# Patient Record
Sex: FEMALE | Race: WHITE | Employment: FULL TIME | URBAN - METROPOLITAN AREA
[De-identification: names, ages, dates, MRNs, and addresses within clinical notes are randomized per-mention and may not be internally consistent; named-entity substitution may affect disease eponyms.]

---

## 2018-01-09 ENCOUNTER — HOSPITAL ENCOUNTER (OUTPATIENT)
Dept: OTHER | Age: 38
Discharge: HOME | End: 2018-01-09
Attending: OBSTETRICS & GYNECOLOGY | Admitting: OBSTETRICS & GYNECOLOGY

## 2022-07-21 ENCOUNTER — OFFICE VISIT (OUTPATIENT)
Dept: OBGYN | Age: 42
End: 2022-07-21
Payer: COMMERCIAL

## 2022-07-21 VITALS
HEIGHT: 62 IN | DIASTOLIC BLOOD PRESSURE: 68 MMHG | SYSTOLIC BLOOD PRESSURE: 96 MMHG | WEIGHT: 146 LBS | BODY MASS INDEX: 26.87 KG/M2

## 2022-07-21 DIAGNOSIS — R10.2 CHRONIC FEMALE PELVIC PAIN: ICD-10-CM

## 2022-07-21 DIAGNOSIS — Z01.419 ENCOUNTER FOR ANNUAL ROUTINE GYNECOLOGICAL EXAMINATION: Primary | ICD-10-CM

## 2022-07-21 DIAGNOSIS — G89.29 CHRONIC FEMALE PELVIC PAIN: ICD-10-CM

## 2022-07-21 DIAGNOSIS — K59.00 CONSTIPATION, UNSPECIFIED CONSTIPATION TYPE: ICD-10-CM

## 2022-07-21 DIAGNOSIS — R14.0 BLOATING: ICD-10-CM

## 2022-07-21 DIAGNOSIS — R30.0 DYSURIA: ICD-10-CM

## 2022-07-21 DIAGNOSIS — Z12.31 SCREENING MAMMOGRAM FOR BREAST CANCER: ICD-10-CM

## 2022-07-21 PROCEDURE — 36415 COLL VENOUS BLD VENIPUNCTURE: CPT | Performed by: OBSTETRICS & GYNECOLOGY

## 2022-07-21 PROCEDURE — 99396 PREV VISIT EST AGE 40-64: CPT | Performed by: OBSTETRICS & GYNECOLOGY

## 2022-07-21 SDOH — ECONOMIC STABILITY: TRANSPORTATION INSECURITY
IN THE PAST 12 MONTHS, HAS THE LACK OF TRANSPORTATION KEPT YOU FROM MEDICAL APPOINTMENTS OR FROM GETTING MEDICATIONS?: NO

## 2022-07-21 SDOH — ECONOMIC STABILITY: FOOD INSECURITY: WITHIN THE PAST 12 MONTHS, THE FOOD YOU BOUGHT JUST DIDN'T LAST AND YOU DIDN'T HAVE MONEY TO GET MORE.: NEVER TRUE

## 2022-07-21 SDOH — ECONOMIC STABILITY: TRANSPORTATION INSECURITY
IN THE PAST 12 MONTHS, HAS LACK OF TRANSPORTATION KEPT YOU FROM MEETINGS, WORK, OR FROM GETTING THINGS NEEDED FOR DAILY LIVING?: NO

## 2022-07-21 SDOH — ECONOMIC STABILITY: FOOD INSECURITY: WITHIN THE PAST 12 MONTHS, YOU WORRIED THAT YOUR FOOD WOULD RUN OUT BEFORE YOU GOT MONEY TO BUY MORE.: NEVER TRUE

## 2022-07-21 ASSESSMENT — ENCOUNTER SYMPTOMS
EYES NEGATIVE: 1
GASTROINTESTINAL NEGATIVE: 1
RESPIRATORY NEGATIVE: 1
ALLERGIC/IMMUNOLOGIC NEGATIVE: 1

## 2022-07-21 ASSESSMENT — PATIENT HEALTH QUESTIONNAIRE - PHQ9
SUM OF ALL RESPONSES TO PHQ QUESTIONS 1-9: 0
SUM OF ALL RESPONSES TO PHQ9 QUESTIONS 1 & 2: 0
SUM OF ALL RESPONSES TO PHQ QUESTIONS 1-9: 0
2. FEELING DOWN, DEPRESSED OR HOPELESS: 0
SUM OF ALL RESPONSES TO PHQ QUESTIONS 1-9: 0
SUM OF ALL RESPONSES TO PHQ QUESTIONS 1-9: 0
1. LITTLE INTEREST OR PLEASURE IN DOING THINGS: 0

## 2022-07-21 ASSESSMENT — SOCIAL DETERMINANTS OF HEALTH (SDOH): HOW HARD IS IT FOR YOU TO PAY FOR THE VERY BASICS LIKE FOOD, HOUSING, MEDICAL CARE, AND HEATING?: NOT HARD AT ALL

## 2022-07-21 NOTE — PROGRESS NOTES
Never   Substance Use Topics    Alcohol use: No     Comment: occasionally    Drug use: No       No current outpatient medications on file. No current facility-administered medications for this visit. Allergies   Allergen Reactions    Amoxicillin     Morphine Nausea And Vomiting     syncope           Immunization History   Administered Date(s) Administered    Influenza Vaccine, unspecified formulation 10/19/2016    Tdap (Boostrix, Adacel) 2016       Review of Systems   Constitutional: Negative. Eyes: Negative. Respiratory: Negative. Cardiovascular: Negative. Gastrointestinal: Negative. Endocrine: Negative. Genitourinary:  Positive for pelvic pain. Musculoskeletal: Negative. Skin: Negative. Allergic/Immunologic: Negative. Neurological: Negative. Hematological: Negative. Psychiatric/Behavioral: Negative. BP 96/68   Ht 5' 2\" (1.575 m)   Wt 146 lb (66.2 kg)   BMI 26.70 kg/m²     Physical Exam  Exam conducted with a chaperone present. Constitutional:       Appearance: Normal appearance. HENT:      Head: Normocephalic and atraumatic. Nose: Nose normal.   Cardiovascular:      Rate and Rhythm: Normal rate. Pulses: Normal pulses. Pulmonary:      Effort: Pulmonary effort is normal.   Chest:      Chest wall: No mass, lacerations, deformity, swelling or tenderness. Breasts:     Right: Normal. No swelling, bleeding, inverted nipple, mass, nipple discharge, skin change, tenderness, axillary adenopathy or supraclavicular adenopathy. Left: Normal. No swelling, bleeding, inverted nipple, mass, nipple discharge, skin change, tenderness, axillary adenopathy or supraclavicular adenopathy. Abdominal:      General: Abdomen is flat. There is no distension. Palpations: Abdomen is soft. There is no mass. Tenderness: There is no abdominal tenderness. Hernia: No hernia is present.  There is no hernia in the left inguinal area or right inguinal area. Genitourinary:     Exam position: Lithotomy position. Pubic Area: No rash. Labia:         Right: No rash, tenderness or lesion. Left: No rash, tenderness or lesion. Urethra: No prolapse, urethral pain, urethral swelling or urethral lesion. Vagina: Normal. No vaginal discharge, erythema, tenderness, bleeding or lesions. Uterus: Absent. Adnexa: Right adnexa normal.        Right: No mass, tenderness or fullness. Left: Tenderness present. No mass or fullness. Rectum: No mass or anal fissure. Normal anal tone. Musculoskeletal:      Cervical back: Normal range of motion and neck supple. Lymphadenopathy:      Upper Body:      Right upper body: No supraclavicular, axillary or pectoral adenopathy. Left upper body: No supraclavicular, axillary or pectoral adenopathy. Lower Body: No right inguinal adenopathy. No left inguinal adenopathy. Skin:     General: Skin is warm and dry. Neurological:      General: No focal deficit present. Mental Status: She is alert and oriented to person, place, and time. Psychiatric:         Mood and Affect: Mood normal.         Behavior: Behavior normal.         Thought Content: Thought content normal.         Judgment: Judgment normal.       No results found for this visit on 07/21/22. Assessment and Plan   Diagnosis Orders   1. Encounter for annual routine gynecological examination        2. Bloating  Blood Occult Stool Diagnostic    Blood Occult Stool Screen #2    Blood Occult Stool Screen #3    XR ABDOMEN (KUB) (SINGLE AP VIEW)      3. Chronic female pelvic pain  CBC with Auto Differential    US NON OB TRANSVAGINAL    XR ABDOMEN (KUB) (SINGLE AP VIEW)      4. Constipation, unspecified constipation type  TSH with Reflex to FT4    Blood Occult Stool Diagnostic    Blood Occult Stool Screen #2    Blood Occult Stool Screen #3    XR ABDOMEN (KUB) (SINGLE AP VIEW)      5.  Dysuria  Culture, Urine Urinalysis with Microscopic      6. Screening mammogram for breast cancer  TRACY DIGITAL SCREEN W OR WO CAD BILATERAL      Follow up after ultrasound    Return in about 1 year (around 7/21/2023).     Arnoldo Dee MD

## 2022-07-22 LAB
BACTERIA: ABNORMAL /HPF
BASOPHILS ABSOLUTE: 0.1 K/UL (ref 0–0.2)
BASOPHILS RELATIVE PERCENT: 0.9 %
BILIRUBIN URINE: NEGATIVE
BLOOD, URINE: NEGATIVE
CLARITY: CLEAR
COLOR: YELLOW
EOSINOPHILS ABSOLUTE: 0.1 K/UL (ref 0–0.6)
EOSINOPHILS RELATIVE PERCENT: 0.9 %
EPITHELIAL CELLS, UA: 11 /HPF (ref 0–5)
GLUCOSE URINE: NEGATIVE MG/DL
HCT VFR BLD CALC: 41.7 % (ref 36–48)
HEMOGLOBIN: 14.6 G/DL (ref 12–16)
HYALINE CASTS: 0 /LPF (ref 0–8)
KETONES, URINE: NEGATIVE MG/DL
LEUKOCYTE ESTERASE, URINE: ABNORMAL
LYMPHOCYTES ABSOLUTE: 2.3 K/UL (ref 1–5.1)
LYMPHOCYTES RELATIVE PERCENT: 29.3 %
MCH RBC QN AUTO: 30.2 PG (ref 26–34)
MCHC RBC AUTO-ENTMCNC: 34.9 G/DL (ref 31–36)
MCV RBC AUTO: 86.6 FL (ref 80–100)
MICROSCOPIC EXAMINATION: YES
MONOCYTES ABSOLUTE: 0.6 K/UL (ref 0–1.3)
MONOCYTES RELATIVE PERCENT: 7.3 %
NEUTROPHILS ABSOLUTE: 4.9 K/UL (ref 1.7–7.7)
NEUTROPHILS RELATIVE PERCENT: 61.6 %
NITRITE, URINE: NEGATIVE
PDW BLD-RTO: 13.1 % (ref 12.4–15.4)
PH UA: 6 (ref 5–8)
PLATELET # BLD: 226 K/UL (ref 135–450)
PMV BLD AUTO: 9.9 FL (ref 5–10.5)
PROTEIN UA: NEGATIVE MG/DL
RBC # BLD: 4.82 M/UL (ref 4–5.2)
RBC UA: 1 /HPF (ref 0–4)
SPECIFIC GRAVITY UA: 1.01 (ref 1–1.03)
TSH REFLEX FT4: 2.05 UIU/ML (ref 0.27–4.2)
URINE TYPE: ABNORMAL
UROBILINOGEN, URINE: 0.2 E.U./DL
WBC # BLD: 8 K/UL (ref 4–11)
WBC UA: 1 /HPF (ref 0–5)

## 2022-07-23 ENCOUNTER — HOSPITAL ENCOUNTER (OUTPATIENT)
Age: 42
Discharge: HOME | End: 2022-07-23
Payer: COMMERCIAL

## 2022-07-23 ENCOUNTER — HOSPITAL ENCOUNTER (OUTPATIENT)
Dept: GENERAL RADIOLOGY | Age: 42
Discharge: HOME | End: 2022-07-23
Payer: COMMERCIAL

## 2022-07-23 DIAGNOSIS — R14.0 BLOATING: ICD-10-CM

## 2022-07-23 DIAGNOSIS — R10.2 CHRONIC FEMALE PELVIC PAIN: ICD-10-CM

## 2022-07-23 DIAGNOSIS — G89.29 CHRONIC FEMALE PELVIC PAIN: ICD-10-CM

## 2022-07-23 DIAGNOSIS — K59.00 CONSTIPATION, UNSPECIFIED CONSTIPATION TYPE: ICD-10-CM

## 2022-07-23 LAB — URINE CULTURE, ROUTINE: NORMAL

## 2022-07-23 PROCEDURE — 74018 RADEX ABDOMEN 1 VIEW: CPT

## 2022-07-27 LAB
HEMOCCULT SP2 STL QL: NORMAL
HEMOCCULT SP3 STL QL: NORMAL
OCCULT BLOOD SCREENING: NORMAL

## 2022-07-28 ENCOUNTER — OFFICE VISIT (OUTPATIENT)
Dept: OBGYN | Age: 42
End: 2022-07-28
Payer: COMMERCIAL

## 2022-07-28 VITALS
DIASTOLIC BLOOD PRESSURE: 79 MMHG | BODY MASS INDEX: 27.42 KG/M2 | WEIGHT: 149 LBS | HEIGHT: 62 IN | SYSTOLIC BLOOD PRESSURE: 118 MMHG

## 2022-07-28 DIAGNOSIS — R39.198 DIFFICULTY VOIDING: ICD-10-CM

## 2022-07-28 DIAGNOSIS — R53.83 OTHER FATIGUE: ICD-10-CM

## 2022-07-28 DIAGNOSIS — R10.2 PELVIC PAIN: Primary | ICD-10-CM

## 2022-07-28 DIAGNOSIS — R10.84 GENERALIZED ABDOMINAL PAIN: ICD-10-CM

## 2022-07-28 PROBLEM — R59.9 SWELLING OF LYMPH NODES: Status: ACTIVE | Noted: 2022-07-28

## 2022-07-28 LAB
BASOPHILS ABSOLUTE: 0.1 K/UL (ref 0–0.2)
BASOPHILS RELATIVE PERCENT: 1.1 %
BILIRUBIN URINE: NEGATIVE
BLOOD, URINE: NEGATIVE
CLARITY: CLEAR
COLOR: YELLOW
EOSINOPHILS ABSOLUTE: 0.1 K/UL (ref 0–0.6)
EOSINOPHILS RELATIVE PERCENT: 1.3 %
GLUCOSE URINE: NEGATIVE MG/DL
HCT VFR BLD CALC: 36.5 % (ref 36–48)
HEMOGLOBIN: 13 G/DL (ref 12–16)
KETONES, URINE: NEGATIVE MG/DL
LEUKOCYTE ESTERASE, URINE: NEGATIVE
LYMPHOCYTES ABSOLUTE: 2.2 K/UL (ref 1–5.1)
LYMPHOCYTES RELATIVE PERCENT: 42 %
MCH RBC QN AUTO: 29.7 PG (ref 26–34)
MCHC RBC AUTO-ENTMCNC: 35.7 G/DL (ref 31–36)
MCV RBC AUTO: 83.4 FL (ref 80–100)
MICROSCOPIC EXAMINATION: NORMAL
MONOCYTES ABSOLUTE: 0.5 K/UL (ref 0–1.3)
MONOCYTES RELATIVE PERCENT: 8.6 %
NEUTROPHILS ABSOLUTE: 2.5 K/UL (ref 1.7–7.7)
NEUTROPHILS RELATIVE PERCENT: 47 %
NITRITE, URINE: NEGATIVE
PDW BLD-RTO: 12.7 % (ref 12.4–15.4)
PH UA: 6 (ref 5–8)
PLATELET # BLD: 227 K/UL (ref 135–450)
PMV BLD AUTO: 9.7 FL (ref 5–10.5)
PROTEIN UA: NEGATIVE MG/DL
RBC # BLD: 4.38 M/UL (ref 4–5.2)
SPECIFIC GRAVITY UA: 1.02 (ref 1–1.03)
URINE REFLEX TO CULTURE: NORMAL
URINE TYPE: NORMAL
UROBILINOGEN, URINE: 0.2 E.U./DL
WBC # BLD: 5.3 K/UL (ref 4–11)

## 2022-07-28 PROCEDURE — 99214 OFFICE O/P EST MOD 30 MIN: CPT | Performed by: OBSTETRICS & GYNECOLOGY

## 2022-07-28 PROCEDURE — 81003 URINALYSIS AUTO W/O SCOPE: CPT | Performed by: OBSTETRICS & GYNECOLOGY

## 2022-07-28 NOTE — PROGRESS NOTES
22    Laurita Mitchell  1980    Chief Complaint   Patient presents with    Follow-up     Pt here to discuss u/s and lab results. States she continues to have pain and pressure. C/o neck and armpit lymph node swelling. Laurita Mitchell is a 38 y.o. female who presents today for evaluation of pelvic pain and new onset right jaw and left axillary lymph nodes which were painful to touch only)    Past Medical History:   Diagnosis Date    Abdominal pain     Abnormal Pap smear of cervix     Abnormal uterine bleeding     Acne     Acute sinus infection     Amenorrhea     Anxiety     Calculus of kidney     Dizziness     Endometriosis     Esophageal reflux     Fatigue     Fibrocystic breast changes     Herpes genitalis     Herpes simplex virus (HSV) infection     Hiatal hernia     Irregular menses     Menorrhagia     Pelvic pain     Pink eye     Premature labor     Supervision of high risk pregnancy in second trimester 2016    Swelling of lymph nodes     Two vessel umbilical cord in [place]ard pregnancy, antepartum 2016    Urinary leakage     Vaginal bleeding     Vaginal bleeding in pregnancy 2016    Vaginal discharge        Past Surgical History:   Procedure Laterality Date    CHOLECYSTECTOMY      COLPOSCOPY      ENDOMETRIAL ABLATION      HERNIA REPAIR      HYSTERECTOMY, VAGINAL      HYSTERECTOMY, VAGINAL      HYSTEROSCOPY      SKIN BIOPSY      back, right axilla    TUBAL LIGATION         Social History     Tobacco Use    Smoking status: Former     Types: Cigarettes     Quit date: 10/20/2010     Years since quittin.7    Smokeless tobacco: Never   Substance Use Topics    Alcohol use: No     Comment: occasionally    Drug use: No       No family history on file. No current outpatient medications on file. No current facility-administered medications for this visit.        Allergies   Allergen Reactions    Amoxicillin     Morphine Nausea And Vomiting     syncope [id number]    Immunization History   Administered Date(s) Administered    Influenza Vaccine, unspecified formulation 10/19/2016    Tdap (Boostrix, Adacel) 12/23/2016       Review of Systems    /79   Ht 5' 2\" (1.575 m)   Wt 149 lb (67.6 kg)   LMP 11/25/2015 (Exact Date)   BMI 27.25 kg/m²     Physical Exam  Constitutional:       General: She is not in acute distress. Appearance: Normal appearance. She is not ill-appearing. HENT:      Head: Normocephalic. Nose: Nose normal.   Eyes:      General: No scleral icterus. Right eye: No discharge. Left eye: No discharge. Cardiovascular:      Pulses: Normal pulses. Pulmonary:      Effort: Pulmonary effort is normal.   Abdominal:      General: Abdomen is flat. There is no distension. Palpations: Abdomen is soft. There is no mass. Tenderness: There is no abdominal tenderness. Hernia: No hernia is present. Musculoskeletal:         General: Normal range of motion. Cervical back: Normal range of motion and neck supple. No rigidity. Skin:     General: Skin is warm and dry. Neurological:      General: No focal deficit present. Mental Status: She is alert and oriented to person, place, and time.    Psychiatric:         Mood and Affect: Mood normal.         Behavior: Behavior normal.     Discussed that axillary and submandibular lymph nodes are normal today (patient reports they have decreased in size)  No results found for this visit on 07/28/22.  07/27/2022 2108 07/27/2022 2218 Blood Occult Stool Screen #3 [[id number]]    Stool    Component Value   Occult Blood, Stool #3 Result: Negative   Normal range: Negative           07/27/2022 2107 07/27/2022 2218 Blood Occult Stool Screen #2 [[id number]]    Stool    Component Value   Occult Blood, Stool #2 Result: Negative   Normal range: Negative           07/27/2022 2106 07/27/2022 2218 Blood Occult Stool Screen #1 [[id number]]    Stool    Component Value   Occult Blood Screening Result: Negative   Normal range: Negative           07/21/2022 1138 07/22/2022 0336 Urinalysis with Microscopic [[id number]]   (Abnormal)   Urine, clean catch    Component Value Units   Color, UA Yellow    Clarity, UA Clear    Glucose, Ur Negative mg/dL   Bilirubin Urine Negative    Ketones, Urine Negative mg/dL   Specific Gravity, UA 1.010    Blood, Urine Negative    pH, UA 6.0    Protein, UA Negative mg/dL   Urobilinogen, Urine 0.2 E.U./dL   Nitrite, Urine Negative    Leukocyte Esterase, Urine TRACE Abnormal     Microscopic Examination YES    Urine Type Cleancatch    Bacteria, UA Rare Abnormal  /HPF   Hyaline Casts, UA 0 /LPF   WBC, UA 1 /HPF   RBC, UA 1 /HPF   Epithelial Cells, UA 11 High   /HPF          07/21/2022 1138 07/23/2022 0755 Culture, Urine [[id number]]    Urine, clean catch    Component Value   Urine Culture, Routine <50,000 CFU/ml mixed skin/urogenital mahamed. No further workup          07/21/2022 1130 07/22/2022 0416 TSH with Reflex to FT4 [[id number]]   Blood    Component Value Units   TSH Reflex FT4 2.05 uIU/mL          07/21/2022 1130 07/22/2022 0330 CBC with Auto Differential [[id number]]   Blood    Component Value Units   WBC 8.0 K/uL   RBC 4.82 M/uL   Hemoglobin 14.6 g/dL   Hematocrit 41.7 %   MCV 86.6 fL   MCH 30.2 pg   MCHC 34.9 g/dL   RDW 13.1 %   Platelets 473 K/uL   MPV 9.9 fL   Neutrophils % 61.6 %   Lymphocytes % 29.3 %   Monocytes % 7.3 %   Eosinophils % 0.9 %   Basophils % 0.9 %   Neutrophils Absolute 4.9 K/uL   Lymphocytes Absolute 2.3 K/uL   Monocytes Absolute 0.6 K/uL   Eosinophils Absolute 0.1 K/uL   Basophils Absolute 0.1 K/uL          See us 7/27/2022    ASSESSMENT AND PLAN   Diagnosis Orders   1. Pelvic pain  CT ABDOMEN PELVIS W IV CONTRAST Additional Contrast? Radiologist Recommendation      2. Generalized abdominal pain  CT ABDOMEN PELVIS W IV CONTRAST Additional Contrast? Radiologist Recommendation      3.  Difficulty voiding  CBC with Auto Differential    Urinalysis with Reflex to Culture    CT ABDOMEN PELVIS W IV CONTRAST Additional Contrast? Radiologist Recommendation      4.  Other fatigue              Mmg scheduled august 22, 2022    Recommend seeing PCP for opinion    Discussed normal gyn ultrasound  Una Dacosta MD

## 2022-08-22 ENCOUNTER — HOSPITAL ENCOUNTER (OUTPATIENT)
Dept: WOMENS IMAGING | Age: 42
Discharge: HOME | End: 2022-08-22
Payer: COMMERCIAL

## 2022-08-22 DIAGNOSIS — Z12.31 SCREENING MAMMOGRAM FOR BREAST CANCER: ICD-10-CM

## 2022-08-22 PROCEDURE — 77063 BREAST TOMOSYNTHESIS BI: CPT

## 2023-07-25 ENCOUNTER — OFFICE VISIT (OUTPATIENT)
Dept: OBGYN | Age: 43
End: 2023-07-25
Payer: COMMERCIAL

## 2023-07-25 VITALS
DIASTOLIC BLOOD PRESSURE: 65 MMHG | SYSTOLIC BLOOD PRESSURE: 135 MMHG | WEIGHT: 157 LBS | HEART RATE: 89 BPM | HEIGHT: 62 IN | BODY MASS INDEX: 28.89 KG/M2

## 2023-07-25 DIAGNOSIS — Z01.419 ENCOUNTER FOR ANNUAL ROUTINE GYNECOLOGICAL EXAMINATION: Primary | ICD-10-CM

## 2023-07-25 DIAGNOSIS — Z12.31 SCREENING MAMMOGRAM FOR BREAST CANCER: ICD-10-CM

## 2023-07-25 DIAGNOSIS — R63.5 WEIGHT GAIN: ICD-10-CM

## 2023-07-25 DIAGNOSIS — G89.29 CHRONIC FEMALE PELVIC PAIN: ICD-10-CM

## 2023-07-25 DIAGNOSIS — R53.83 FATIGUE, UNSPECIFIED TYPE: ICD-10-CM

## 2023-07-25 DIAGNOSIS — R10.2 CHRONIC FEMALE PELVIC PAIN: ICD-10-CM

## 2023-07-25 DIAGNOSIS — N95.1 MENOPAUSAL SYMPTOM: ICD-10-CM

## 2023-07-25 PROCEDURE — 99396 PREV VISIT EST AGE 40-64: CPT | Performed by: OBSTETRICS & GYNECOLOGY

## 2023-07-25 PROCEDURE — 36415 COLL VENOUS BLD VENIPUNCTURE: CPT | Performed by: OBSTETRICS & GYNECOLOGY

## 2023-07-25 PROCEDURE — 81003 URINALYSIS AUTO W/O SCOPE: CPT | Performed by: OBSTETRICS & GYNECOLOGY

## 2023-07-25 SDOH — ECONOMIC STABILITY: HOUSING INSECURITY
IN THE LAST 12 MONTHS, WAS THERE A TIME WHEN YOU DID NOT HAVE A STEADY PLACE TO SLEEP OR SLEPT IN A SHELTER (INCLUDING NOW)?: NO

## 2023-07-25 SDOH — ECONOMIC STABILITY: FOOD INSECURITY: WITHIN THE PAST 12 MONTHS, THE FOOD YOU BOUGHT JUST DIDN'T LAST AND YOU DIDN'T HAVE MONEY TO GET MORE.: NEVER TRUE

## 2023-07-25 SDOH — ECONOMIC STABILITY: FOOD INSECURITY: WITHIN THE PAST 12 MONTHS, YOU WORRIED THAT YOUR FOOD WOULD RUN OUT BEFORE YOU GOT MONEY TO BUY MORE.: NEVER TRUE

## 2023-07-25 SDOH — ECONOMIC STABILITY: INCOME INSECURITY: HOW HARD IS IT FOR YOU TO PAY FOR THE VERY BASICS LIKE FOOD, HOUSING, MEDICAL CARE, AND HEATING?: NOT HARD AT ALL

## 2023-07-25 ASSESSMENT — PATIENT HEALTH QUESTIONNAIRE - PHQ9
SUM OF ALL RESPONSES TO PHQ QUESTIONS 1-9: 0
1. LITTLE INTEREST OR PLEASURE IN DOING THINGS: 0
2. FEELING DOWN, DEPRESSED OR HOPELESS: 0
SUM OF ALL RESPONSES TO PHQ QUESTIONS 1-9: 0
SUM OF ALL RESPONSES TO PHQ9 QUESTIONS 1 & 2: 0
SUM OF ALL RESPONSES TO PHQ QUESTIONS 1-9: 0
SUM OF ALL RESPONSES TO PHQ QUESTIONS 1-9: 0

## 2023-07-25 ASSESSMENT — ENCOUNTER SYMPTOMS
ALLERGIC/IMMUNOLOGIC NEGATIVE: 1
RESPIRATORY NEGATIVE: 1
GASTROINTESTINAL NEGATIVE: 1
EYES NEGATIVE: 1

## 2023-07-25 NOTE — PROGRESS NOTES
23    Shahnaz Brannon  1980    Chief Complaint   Patient presents with    Gynecologic Exam     Pt here for annual, is sexually active, had hyster without ovary removal, hrt-none, [id number], -neg. Pelvic Pain     Pt c/o intermittent pelvic pain x 1 yr, worse x 6 months ago, severe, sharp, does not radiate. Pt also c/o fatigue, facial hair and weight gain x 9 month. Labs done 2023 by pcp and hematologist.         Shahnaz Brannon is a 41 y.o. female who presents today for evaluation of annual exam as above.       Past Medical History:   Diagnosis Date    Abdominal pain     Abnormal Pap smear of cervix     Abnormal uterine bleeding     Acne     Acute sinus infection     Amenorrhea     Anxiety     Arthritis, rheumatoid (HCC)     Calculus of kidney     Dizziness     Endometriosis     Esophageal reflux     Fatigue     Fatigue     Fibrocystic breast changes     Herpes genitalis     Herpes simplex virus (HSV) infection     Hiatal hernia     Irregular menses     Menorrhagia     Pelvic pain     Pink eye     Premature labor     Supervision of high risk pregnancy in second trimester 2016    Swelling of lymph nodes     Two vessel umbilical cord in [place] pregnancy, antepartum 2016    Urinary leakage     Vaginal bleeding     Vaginal bleeding in pregnancy 2016    Vaginal discharge     Weight gain        Past Surgical History:   Procedure Laterality Date    CHOLECYSTECTOMY      COLPOSCOPY      ENDOMETRIAL ABLATION      HERNIA REPAIR      HYSTERECTOMY (CERVIX STATUS UNKNOWN)      HYSTERECTOMY, VAGINAL      HYSTERECTOMY, VAGINAL      HYSTEROSCOPY      SKIN BIOPSY      back, right axilla    TUBAL LIGATION         Social History     Tobacco Use    Smoking status: Former     Types: Cigarettes     Quit date: 10/20/2010     Years since quittin.7    Smokeless tobacco: Never   Vaping Use    Vaping Use: Never used   Substance Use Topics    Alcohol use: No     Comment: occasionally    Drug

## 2023-07-26 LAB
BILIRUB UR QL STRIP.AUTO: NEGATIVE
CLARITY UR: CLEAR
COLOR UR: YELLOW
FSH SERPL-ACNC: 18 MIU/ML
GLUCOSE UR STRIP.AUTO-MCNC: NEGATIVE MG/DL
HGB UR QL STRIP.AUTO: NEGATIVE
KETONES UR STRIP.AUTO-MCNC: NEGATIVE MG/DL
LEUKOCYTE ESTERASE UR QL STRIP.AUTO: NEGATIVE
NITRITE UR QL STRIP.AUTO: NEGATIVE
PH UR STRIP.AUTO: 6.5 [PH] (ref 5–8)
PROT UR STRIP.AUTO-MCNC: NEGATIVE MG/DL
SP GR UR STRIP.AUTO: 1.01 (ref 1–1.03)
UA COMPLETE W REFLEX CULTURE PNL UR: NORMAL
UA DIPSTICK W REFLEX MICRO PNL UR: NORMAL
URN SPEC COLLECT METH UR: NORMAL
UROBILINOGEN UR STRIP-ACNC: 1 E.U./DL

## 2023-07-27 LAB
MIS SERPL-MCNC: 0.47 NG/ML
SHBG SERPL-SCNC: 52 NMOL/L (ref 30–135)
TESTOST FREE SERPL-MCNC: <1 PG/ML (ref 1.1–5.8)
TESTOST SERPL-MCNC: 6 NG/DL (ref 20–70)

## 2023-07-28 LAB
ESTRADIOL SERPL HS-MCNC: 19.1 PG/ML
ESTROGEN SERPL CALC-MCNC: 38.1 PG/ML
ESTRONE SERPL-MCNC: 19 PG/ML

## 2023-08-08 DIAGNOSIS — Z12.31 SCREENING MAMMOGRAM FOR BREAST CANCER: ICD-10-CM

## 2023-08-22 ENCOUNTER — OFFICE VISIT (OUTPATIENT)
Dept: OBGYN | Age: 43
End: 2023-08-22
Payer: COMMERCIAL

## 2023-08-22 VITALS
DIASTOLIC BLOOD PRESSURE: 74 MMHG | BODY MASS INDEX: 27.6 KG/M2 | SYSTOLIC BLOOD PRESSURE: 113 MMHG | WEIGHT: 150 LBS | HEIGHT: 62 IN

## 2023-08-22 DIAGNOSIS — N95.1 MENOPAUSAL SYMPTOM: Primary | ICD-10-CM

## 2023-08-22 DIAGNOSIS — G89.29 CHRONIC FEMALE PELVIC PAIN: ICD-10-CM

## 2023-08-22 DIAGNOSIS — R53.83 FATIGUE, UNSPECIFIED TYPE: ICD-10-CM

## 2023-08-22 DIAGNOSIS — R10.2 CHRONIC FEMALE PELVIC PAIN: ICD-10-CM

## 2023-08-22 PROCEDURE — 99213 OFFICE O/P EST LOW 20 MIN: CPT | Performed by: OBSTETRICS & GYNECOLOGY

## 2023-08-22 NOTE — PROGRESS NOTES
23    Jing Spencer  1980    Chief Complaint   Patient presents with    Follow-up     Pt here to discuss ultrasound and labs. Pt states she has had same pelvic pain at least twice since last visit. Jing Spencer is a 41 y.o. female who presents today for evaluation of chronic female pelvic pain. Obesity, weight gain.   Pain is mild    Past Medical History:   Diagnosis Date    Abdominal pain     Abnormal Pap smear of cervix     Abnormal uterine bleeding     Acne     Acute sinus infection     Amenorrhea     Anxiety     Arthritis, rheumatoid (HCC)     Calculus of kidney     Dizziness     Endometriosis     Esophageal reflux     Fatigue     Fatigue     Fibrocystic breast changes     Herpes genitalis     Herpes simplex virus (HSV) infection     Hiatal hernia     Irregular menses     Menorrhagia     Pelvic pain     Pink eye     Premature labor     Supervision of high risk pregnancy in second trimester 2016    Swelling of lymph nodes     Two vessel umbilical cord in [place] pregnancy, antepartum 2016    Urinary leakage     Vaginal bleeding     Vaginal bleeding in pregnancy 2016    Vaginal discharge     Weight gain        Past Surgical History:   Procedure Laterality Date    CHOLECYSTECTOMY      COLPOSCOPY      ENDOMETRIAL ABLATION      HERNIA REPAIR      HYSTERECTOMY (CERVIX STATUS UNKNOWN)      HYSTERECTOMY, VAGINAL      HYSTERECTOMY, VAGINAL      HYSTEROSCOPY      SKIN BIOPSY      back, right axilla    TUBAL LIGATION         Social History     Tobacco Use    Smoking status: Former     Types: Cigarettes     Quit date: 10/20/2010     Years since quittin.8    Smokeless tobacco: Never   Vaping Use    Vaping Use: Never used   Substance Use Topics    Alcohol use: No     Comment: occasionally    Drug use: No       Family History   Problem Relation Age of Onset    Hypertension Father     Diabetes Mother     Anemia Mother     Anemia Sister        Current Outpatient Medications

## 2024-02-21 ENCOUNTER — NURSE ONLY (OUTPATIENT)
Dept: OBGYN | Age: 44
End: 2024-02-21
Payer: COMMERCIAL

## 2024-02-21 DIAGNOSIS — N95.1 MENOPAUSAL SYMPTOM: ICD-10-CM

## 2024-02-21 DIAGNOSIS — R53.83 FATIGUE, UNSPECIFIED TYPE: ICD-10-CM

## 2024-02-21 PROCEDURE — 36415 COLL VENOUS BLD VENIPUNCTURE: CPT | Performed by: OBSTETRICS & GYNECOLOGY

## 2024-02-24 LAB
ESTRADIOL SERPL HS-MCNC: 93.8 PG/ML
ESTROGEN SERPL CALC-MCNC: 158.5 PG/ML
ESTRONE SERPL-MCNC: 64.7 PG/ML

## 2024-02-25 ASSESSMENT — PATIENT HEALTH QUESTIONNAIRE - PHQ9
2. FEELING DOWN, DEPRESSED OR HOPELESS: 1
SUM OF ALL RESPONSES TO PHQ QUESTIONS 1-9: 2
SUM OF ALL RESPONSES TO PHQ9 QUESTIONS 1 & 2: 2
1. LITTLE INTEREST OR PLEASURE IN DOING THINGS: SEVERAL DAYS
2. FEELING DOWN, DEPRESSED OR HOPELESS: SEVERAL DAYS
1. LITTLE INTEREST OR PLEASURE IN DOING THINGS: 1
SUM OF ALL RESPONSES TO PHQ9 QUESTIONS 1 & 2: 2
SUM OF ALL RESPONSES TO PHQ QUESTIONS 1-9: 2

## 2024-02-26 ENCOUNTER — OFFICE VISIT (OUTPATIENT)
Dept: OBGYN | Age: 44
End: 2024-02-26
Payer: COMMERCIAL

## 2024-02-26 VITALS
DIASTOLIC BLOOD PRESSURE: 86 MMHG | SYSTOLIC BLOOD PRESSURE: 131 MMHG | BODY MASS INDEX: 27.23 KG/M2 | HEIGHT: 62 IN | WEIGHT: 148 LBS

## 2024-02-26 DIAGNOSIS — R59.0 AXILLARY LYMPHADENOPATHY: Primary | ICD-10-CM

## 2024-02-26 DIAGNOSIS — M06.9 RHEUMATOID ARTHRITIS, INVOLVING UNSPECIFIED SITE, UNSPECIFIED WHETHER RHEUMATOID FACTOR PRESENT (HCC): ICD-10-CM

## 2024-02-26 DIAGNOSIS — L70.0 ACNE VULGARIS: ICD-10-CM

## 2024-02-26 PROCEDURE — 99214 OFFICE O/P EST MOD 30 MIN: CPT | Performed by: OBSTETRICS & GYNECOLOGY

## 2024-02-26 RX ORDER — SPIRONOLACTONE 100 MG/1
100 TABLET, FILM COATED ORAL DAILY
Qty: 90 TABLET | Refills: 1 | Status: SHIPPED | OUTPATIENT
Start: 2024-02-26

## 2024-02-26 NOTE — PROGRESS NOTES
didn't.     Breast Problem     Pt c/o swollen lymph nodes in underarms x 3-4 months more so on left side.        Zakiya Fuentes is a 43 y.o. female who presents today for evaluation of acne, concern for hormonal imbalance, b/l swollen axillae, concern for RA flare  Past Medical History:   Diagnosis Date    Abdominal pain     Abnormal Pap smear of cervix     Abnormal uterine bleeding     Acne     Acute sinus infection     Amenorrhea     Anxiety     Arthritis, rheumatoid (HCC)     Calculus of kidney     Dizziness     Endometriosis     Esophageal reflux     Fatigue     Fatigue     Fibrocystic breast changes     Herpes genitalis     Herpes simplex virus (HSV) infection     Hiatal hernia     Irregular menses     Menorrhagia     Pelvic pain     Pink eye     Premature labor     Supervision of high risk pregnancy in second trimester 2016    Swelling of lymph nodes     Two vessel umbilical cord in [place] pregnancy, antepartum 2016    Urinary leakage     Vaginal bleeding     Vaginal bleeding in pregnancy 2016    Vaginal discharge     Weight gain        Past Surgical History:   Procedure Laterality Date    CHOLECYSTECTOMY      COLPOSCOPY      ENDOMETRIAL ABLATION      HERNIA REPAIR      HYSTERECTOMY (CERVIX STATUS UNKNOWN)      HYSTERECTOMY, VAGINAL      HYSTERECTOMY, VAGINAL      HYSTEROSCOPY      SKIN BIOPSY      back, right axilla    TUBAL LIGATION         Social History     Tobacco Use    Smoking status: Former     Current packs/day: 0.00     Types: Cigarettes     Quit date: 10/20/2010     Years since quittin.3    Smokeless tobacco: Never   Vaping Use    Vaping Use: Never used   Substance Use Topics    Alcohol use: No     Comment: occasionally    Drug use: No       Family History   Problem Relation Age of Onset    Hypertension Father     Diabetes Mother     Anemia Mother     Anemia Sister        Current Outpatient Medications   Medication Sig Dispense Refill    spironolactone (ALDACTONE) 100 MG

## 2024-02-27 LAB
BASOPHILS # BLD: 0 K/UL (ref 0–0.2)
BASOPHILS NFR BLD: 0.3 %
DEPRECATED RDW RBC AUTO: 12.8 % (ref 12.4–15.4)
EOSINOPHIL # BLD: 0.1 K/UL (ref 0–0.6)
EOSINOPHIL NFR BLD: 1.9 %
HCT VFR BLD AUTO: 39.2 % (ref 36–48)
HGB BLD-MCNC: 13.8 G/DL (ref 12–16)
LYMPHOCYTES # BLD: 3.1 K/UL (ref 1–5.1)
LYMPHOCYTES NFR BLD: 40.4 %
MCH RBC QN AUTO: 29.5 PG (ref 26–34)
MCHC RBC AUTO-ENTMCNC: 35.3 G/DL (ref 31–36)
MCV RBC AUTO: 83.5 FL (ref 80–100)
MONOCYTES # BLD: 0.6 K/UL (ref 0–1.3)
MONOCYTES NFR BLD: 8 %
NEUTROPHILS # BLD: 3.8 K/UL (ref 1.7–7.7)
NEUTROPHILS NFR BLD: 49.4 %
PLATELET # BLD AUTO: 212 K/UL (ref 135–450)
PMV BLD AUTO: 9.8 FL (ref 5–10.5)
RBC # BLD AUTO: 4.69 M/UL (ref 4–5.2)
WBC # BLD AUTO: 7.7 K/UL (ref 4–11)

## 2024-02-29 DIAGNOSIS — R59.0 AXILLARY LYMPHADENOPATHY: Primary | ICD-10-CM

## 2024-03-11 ENCOUNTER — HOSPITAL ENCOUNTER (OUTPATIENT)
Dept: ULTRASOUND IMAGING | Age: 44
Discharge: HOME | End: 2024-03-11
Attending: OBSTETRICS & GYNECOLOGY
Payer: COMMERCIAL

## 2024-03-11 DIAGNOSIS — R59.0 AXILLARY LYMPHADENOPATHY: ICD-10-CM

## 2024-03-11 PROCEDURE — 76882 US LMTD JT/FCL EVL NVASC XTR: CPT

## 2024-03-25 ENCOUNTER — OFFICE VISIT (OUTPATIENT)
Dept: OBGYN | Age: 44
End: 2024-03-25
Payer: COMMERCIAL

## 2024-03-25 VITALS
SYSTOLIC BLOOD PRESSURE: 112 MMHG | HEIGHT: 62 IN | WEIGHT: 150 LBS | BODY MASS INDEX: 27.6 KG/M2 | DIASTOLIC BLOOD PRESSURE: 72 MMHG

## 2024-03-25 DIAGNOSIS — L65.9 HAIR LOSS DISORDER: ICD-10-CM

## 2024-03-25 DIAGNOSIS — L68.0 HIRSUTISM: ICD-10-CM

## 2024-03-25 DIAGNOSIS — R59.0 AXILLARY LYMPHADENOPATHY: Primary | ICD-10-CM

## 2024-03-25 PROCEDURE — 99214 OFFICE O/P EST MOD 30 MIN: CPT | Performed by: OBSTETRICS & GYNECOLOGY

## 2024-03-25 RX ORDER — SPIRONOLACTONE 100 MG/1
100 TABLET, FILM COATED ORAL DAILY
Qty: 90 TABLET | Refills: 1 | Status: SHIPPED | OUTPATIENT
Start: 2024-03-25

## 2024-03-25 NOTE — PROGRESS NOTES
Vomiting     syncope           Immunization History   Administered Date(s) Administered    Influenza Vaccine, unspecified formulation 10/19/2016    TDaP, ADACEL (age 10y-64y), BOOSTRIX (age 10y+), IM, 0.5mL 2016       Review of Systems    /72 (Site: Right Upper Arm, Position: Sitting, Cuff Size: Large Adult)   Ht 1.575 m (5' 2\")   Wt 68 kg (150 lb)   LMP 2015 (Exact Date)   BMI 27.44 kg/m²     Physical Exam    No results found for this visit on 24.    ASSESSMENT AND PLAN   Diagnosis Orders   1. Axillary lymphadenopathy        2. Hirsutism  TSH with Reflex to FT4    Comprehensive Metabolic Panel    Testosterone, free, total    spironolactone (ALDACTONE) 100 MG tablet      3. Hair loss disorder  spironolactone (ALDACTONE) 100 MG tablet            Mekhi Austin MD

## 2024-03-26 LAB
ALBUMIN SERPL-MCNC: 4.6 G/DL (ref 3.4–5)
ALBUMIN/GLOB SERPL: 2.1 {RATIO} (ref 1.1–2.2)
ALP SERPL-CCNC: 62 U/L (ref 40–129)
ALT SERPL-CCNC: 13 U/L (ref 10–40)
ANION GAP SERPL CALCULATED.3IONS-SCNC: 12 MMOL/L (ref 3–16)
AST SERPL-CCNC: 12 U/L (ref 15–37)
BILIRUB SERPL-MCNC: 0.4 MG/DL (ref 0–1)
BUN SERPL-MCNC: 13 MG/DL (ref 7–20)
CALCIUM SERPL-MCNC: 9.7 MG/DL (ref 8.3–10.6)
CHLORIDE SERPL-SCNC: 103 MMOL/L (ref 99–110)
CO2 SERPL-SCNC: 26 MMOL/L (ref 21–32)
CREAT SERPL-MCNC: 0.7 MG/DL (ref 0.6–1.1)
GFR SERPLBLD CREATININE-BSD FMLA CKD-EPI: >90 ML/MIN/{1.73_M2}
GLUCOSE SERPL-MCNC: 92 MG/DL (ref 70–99)
POTASSIUM SERPL-SCNC: 3.9 MMOL/L (ref 3.5–5.1)
PROT SERPL-MCNC: 6.8 G/DL (ref 6.4–8.2)
SODIUM SERPL-SCNC: 141 MMOL/L (ref 136–145)
TSH SERPL DL<=0.005 MIU/L-ACNC: 1.15 UIU/ML (ref 0.27–4.2)

## 2024-03-27 LAB
SHBG SERPL-SCNC: 65 NMOL/L (ref 25–122)
TESTOST FREE SERPL-MCNC: ABNORMAL PG/ML (ref 1.1–5.8)
TESTOST SERPL-MCNC: <3 NG/DL (ref 8–48)

## 2024-06-22 NOTE — PROGRESS NOTES
RHEUMATOLOGY NEW PATIENT VISIT    2024      Patient Name: Zakiya Fuentes  : 1980  Medical Record: [id number]      CHIEF COMPLAINT    Rheumatoid factor positive  Polyarthralgia    Pertinent Problems  Hx of L4-L5 fracture   Low back pain w/ occasional bilateral sciatica    HISTORY OF PRESENT ILLNESS    Zakiya Fuentes is a 43 y.o. female who was referred by Mekhi Austin.Symptom onset began about 3.5 years ago when RF was noted to be positive. She declined treatment and has been felling better since weight loss. In  she started experiencing progressing fatigue and hand pain.     Disease progression:   Today, patient describes joint pain in bilateral hands associated with swelling  Joint stiffness in am that worsens as the day progresses  Relieving factors: rest   Worsening factors: activity  Associated symptoms: swelling, twitching of right thumb  Pain level today: 3/10   No recent hospitalizations or fever/infections  Functional status: Patient is a  for a [employer] company that requires a lot of typing.      Other rheumatologic symptoms :  Denies chest pain, SOB, fever, rash, oral/nasal ulcers, change in mental status, sicca symptoms, Muscle pain, muscle weakness, raynaud's, puffy fingers or skin thickening. History of blood clots, dactylitis, uveitis, enthesitis, psoriasis, buttock pain, change in BM    Risk factors: Former smoking X 13 years ago, occasional etoh, no obesity, no recreational drug use, denies family hx of RA, Lupus, CTD. All her grand parents, father and brother have OA      Current rheum meds: none    Past rheum meds:          No data to display                    REVIEW OF SYSTEMS     Constitutional:  Denies fever or chills, decreased appetite, or weight loss   Eyes:  Denies change in visual acuity or eye dryness or irritation  HENT:  Denies dry mouth or oral ulcers  Respiratory:  Denies cough or shortness of breath   Cardiovascular:

## 2024-06-24 ENCOUNTER — OFFICE VISIT (OUTPATIENT)
Dept: RHEUMATOLOGY | Age: 44
End: 2024-06-24
Payer: COMMERCIAL

## 2024-06-24 ENCOUNTER — HOSPITAL ENCOUNTER (OUTPATIENT)
Age: 44
Discharge: HOME | End: 2024-06-24
Payer: COMMERCIAL

## 2024-06-24 VITALS
OXYGEN SATURATION: 98 % | SYSTOLIC BLOOD PRESSURE: 123 MMHG | WEIGHT: 148 LBS | DIASTOLIC BLOOD PRESSURE: 70 MMHG | BODY MASS INDEX: 27.07 KG/M2 | HEART RATE: 68 BPM

## 2024-06-24 DIAGNOSIS — L56.8 PHOTOSENSITIVITY: ICD-10-CM

## 2024-06-24 DIAGNOSIS — R76.8 RHEUMATOID FACTOR POSITIVE: ICD-10-CM

## 2024-06-24 DIAGNOSIS — R76.8 RHEUMATOID FACTOR POSITIVE: Primary | ICD-10-CM

## 2024-06-24 DIAGNOSIS — Z01.89 ENCOUNTER FOR OTHER SPECIFIED SPECIAL EXAMINATIONS: ICD-10-CM

## 2024-06-24 DIAGNOSIS — M25.50 POLYARTHRALGIA: ICD-10-CM

## 2024-06-24 LAB
25(OH)D3 SERPL-MCNC: 46.68 NG/ML
ALBUMIN SERPL-MCNC: 4.1 GM/DL (ref 3.4–5)
ALBUMIN SERPL-MCNC: 4.1 GM/DL (ref 3.4–5)
ALP BLD-CCNC: 63 IU/L (ref 40–129)
ALT SERPL-CCNC: 15 U/L (ref 10–40)
ANION GAP SERPL CALCULATED.3IONS-SCNC: 12 MMOL/L (ref 7–16)
AST SERPL-CCNC: 13 IU/L (ref 15–37)
BILIRUB SERPL-MCNC: 0.4 MG/DL (ref 0–1)
BILIRUBIN DIRECT: 0.2 MG/DL (ref 0–0.3)
BILIRUBIN, INDIRECT: 0.2 MG/DL (ref 0–0.7)
BUN SERPL-MCNC: 10 MG/DL (ref 6–23)
CALCIUM SERPL-MCNC: 9.4 MG/DL (ref 8.3–10.6)
CHLORIDE BLD-SCNC: 105 MMOL/L (ref 99–110)
CO2: 24 MMOL/L (ref 21–32)
CREAT SERPL-MCNC: 0.6 MG/DL (ref 0.6–1.1)
CRP SERPL HS-MCNC: 3 MG/L
GFR, ESTIMATED: >90 ML/MIN/1.73M2
GLUCOSE SERPL-MCNC: 97 MG/DL (ref 70–99)
HAV IGM SERPL QL IA: NON REACTIVE
HBV CORE IGM SERPL QL IA: NON REACTIVE
HBV SURFACE AG SERPL QL IA: NON REACTIVE
HCT VFR BLD CALC: 38.9 % (ref 37–47)
HCV AB SERPL QL IA: NON REACTIVE
HEMOGLOBIN: 13.3 GM/DL (ref 12.5–16)
MCH RBC QN AUTO: 30 PG (ref 27–31)
MCHC RBC AUTO-ENTMCNC: 34.2 % (ref 32–36)
MCV RBC AUTO: 87.6 FL (ref 78–100)
PDW BLD-RTO: 12.2 % (ref 11.7–14.9)
PHOSPHORUS: 3.1 MG/DL (ref 2.5–4.9)
PLATELET # BLD: 236 K/CU MM (ref 140–440)
PMV BLD AUTO: 11.2 FL (ref 7.5–11.1)
POTASSIUM SERPL-SCNC: 4.4 MMOL/L (ref 3.5–5.1)
RBC # BLD: 4.44 M/CU MM (ref 4.2–5.4)
SED RATE, AUTOMATED: <1 MM/HR (ref 0–20)
SODIUM BLD-SCNC: 141 MMOL/L (ref 135–145)
TOTAL PROTEIN: 6.6 GM/DL (ref 6.4–8.2)
WBC # BLD: 6.6 K/CU MM (ref 4–10.5)

## 2024-06-24 PROCEDURE — 85027 COMPLETE CBC AUTOMATED: CPT

## 2024-06-24 PROCEDURE — 86140 C-REACTIVE PROTEIN: CPT

## 2024-06-24 PROCEDURE — 85652 RBC SED RATE AUTOMATED: CPT

## 2024-06-24 PROCEDURE — 86430 RHEUMATOID FACTOR TEST QUAL: CPT

## 2024-06-24 PROCEDURE — 84100 ASSAY OF PHOSPHORUS: CPT

## 2024-06-24 PROCEDURE — 86200 CCP ANTIBODY: CPT

## 2024-06-24 PROCEDURE — 86480 TB TEST CELL IMMUN MEASURE: CPT

## 2024-06-24 PROCEDURE — 99205 OFFICE O/P NEW HI 60 MIN: CPT | Performed by: STUDENT IN AN ORGANIZED HEALTH CARE EDUCATION/TRAINING PROGRAM

## 2024-06-24 PROCEDURE — 80074 ACUTE HEPATITIS PANEL: CPT

## 2024-06-24 PROCEDURE — 86160 COMPLEMENT ANTIGEN: CPT

## 2024-06-24 PROCEDURE — 82248 BILIRUBIN DIRECT: CPT

## 2024-06-24 PROCEDURE — 36415 COLL VENOUS BLD VENIPUNCTURE: CPT

## 2024-06-24 PROCEDURE — 80053 COMPREHEN METABOLIC PANEL: CPT

## 2024-06-24 PROCEDURE — 82306 VITAMIN D 25 HYDROXY: CPT

## 2024-06-26 LAB
C3 SERPL-MCNC: 131 MG/DL (ref 90–180)
C4 SERPL-MCNC: 39 MG/DL (ref 10–40)
CYCLIC CITRULLINE AB IGG/IGA: 19 UNITS (ref 0–19)
RHEUMATOID FACTOR: <10 IU/ML (ref 0–14)

## 2024-06-27 LAB
QUANTIFERON (R) TB GOLD (INCUBATED): NEGATIVE IU/ML
QUANTIFERON MITOGEN MINUS NIL: 9.91 IU/ML
QUANTIFERON NIL: 0.09 IU/ML

## 2024-08-27 ENCOUNTER — TELEPHONE (OUTPATIENT)
Dept: RHEUMATOLOGY | Age: 44
End: 2024-08-27

## 2024-09-18 ENCOUNTER — TELEPHONE (OUTPATIENT)
Dept: RHEUMATOLOGY | Age: 44
End: 2024-09-18

## 2024-12-27 LAB
QUANTI TB1 MINUS NIL: NORMAL
QUANTI TB2 MINUS NIL: NORMAL
QUANTIFERON (R) TB GOLD (INCUBATED): NEGATIVE IU/ML
QUANTIFERON MITOGEN MINUS NIL: 9.91 IU/ML
QUANTIFERON NIL: 0.09 IU/ML

## 2025-04-17 ENCOUNTER — TELEPHONE (OUTPATIENT)
Dept: CARDIOLOGY CLINIC | Age: 45
End: 2025-04-17

## 2025-04-17 NOTE — TELEPHONE ENCOUNTER
[facility]         Patient called to establish cardiac care. Patient was scheduled and given instruction to bring a copy of their state ID, a copy of their insurance cards along with any previous cardiac records. Patient was also asked to bring their current medication bottles to their appointment.      Patient or referring doctor office heard about us through:family   Member sees Dr Mick gatica for  a year

## 2025-04-28 SDOH — ECONOMIC STABILITY: FOOD INSECURITY: WITHIN THE PAST 12 MONTHS, YOU WORRIED THAT YOUR FOOD WOULD RUN OUT BEFORE YOU GOT MONEY TO BUY MORE.: NEVER TRUE

## 2025-04-28 SDOH — ECONOMIC STABILITY: INCOME INSECURITY: IN THE LAST 12 MONTHS, WAS THERE A TIME WHEN YOU WERE NOT ABLE TO PAY THE MORTGAGE OR RENT ON TIME?: NO

## 2025-04-28 SDOH — ECONOMIC STABILITY: FOOD INSECURITY: WITHIN THE PAST 12 MONTHS, THE FOOD YOU BOUGHT JUST DIDN'T LAST AND YOU DIDN'T HAVE MONEY TO GET MORE.: NEVER TRUE

## 2025-04-29 ENCOUNTER — OFFICE VISIT (OUTPATIENT)
Dept: OBGYN | Age: 45
End: 2025-04-29
Payer: COMMERCIAL

## 2025-04-29 VITALS
BODY MASS INDEX: 30 KG/M2 | DIASTOLIC BLOOD PRESSURE: 70 MMHG | SYSTOLIC BLOOD PRESSURE: 120 MMHG | WEIGHT: 163 LBS | HEIGHT: 62 IN | HEART RATE: 76 BPM

## 2025-04-29 DIAGNOSIS — G89.29 CHRONIC RLQ PAIN: ICD-10-CM

## 2025-04-29 DIAGNOSIS — Z13.820 ENCOUNTER FOR OSTEOPOROSIS SCREENING IN ASYMPTOMATIC POSTMENOPAUSAL PATIENT: Primary | ICD-10-CM

## 2025-04-29 DIAGNOSIS — R10.31 CHRONIC RLQ PAIN: ICD-10-CM

## 2025-04-29 DIAGNOSIS — Z01.419 ENCOUNTER FOR ANNUAL ROUTINE GYNECOLOGICAL EXAMINATION: Primary | ICD-10-CM

## 2025-04-29 DIAGNOSIS — Z78.0 ENCOUNTER FOR OSTEOPOROSIS SCREENING IN ASYMPTOMATIC POSTMENOPAUSAL PATIENT: Primary | ICD-10-CM

## 2025-04-29 DIAGNOSIS — Z12.31 SCREENING MAMMOGRAM FOR BREAST CANCER: ICD-10-CM

## 2025-04-29 DIAGNOSIS — E66.3 OVERWEIGHT: ICD-10-CM

## 2025-04-29 PROCEDURE — 99396 PREV VISIT EST AGE 40-64: CPT | Performed by: OBSTETRICS & GYNECOLOGY

## 2025-04-29 RX ORDER — CELECOXIB 100 MG/1
100 CAPSULE ORAL DAILY
Qty: 60 CAPSULE | Refills: 3 | Status: SHIPPED | OUTPATIENT
Start: 2025-04-29

## 2025-04-29 ASSESSMENT — PATIENT HEALTH QUESTIONNAIRE - PHQ9
SUM OF ALL RESPONSES TO PHQ QUESTIONS 1-9: 0
2. FEELING DOWN, DEPRESSED OR HOPELESS: NOT AT ALL
SUM OF ALL RESPONSES TO PHQ QUESTIONS 1-9: 0
1. LITTLE INTEREST OR PLEASURE IN DOING THINGS: NOT AT ALL

## 2025-04-29 NOTE — PROGRESS NOTES
Please notify patient that there is no records of any bone density.  I would recommend we do a bone density due to her history of a vertebral fracture.  I have placed the order.

## 2025-04-29 NOTE — PROGRESS NOTES
4/29/25    Zakiya Fuentes  1980    Chief Complaint   Patient presents with    Gynecologic Exam     Annual Exam. Non-smoker No known h/o dvt. Patient had hysterectomy without removal of ovaries. Patient is not currently on HRT. Patient is sexually active. History of abnormal pap 20+ years ago. Patient had a recent mammogram 8/3/023 which was negative for malignancy.  Patient complains of right sided pelvic pain.      Pelvic Pain     Reports right sided pelvic pain. Pain started years ago but over the last 2 months has worsened. Pain is located: right pelvic region  and radiates into lower back. Reports the pain as severe and shooting, stabbing, and tender quality. Aggravating factors : Bowel movements, movement , standing, and movement on that side. Alleviating factors are : heat.. Not evaluated for symptoms in the past.            Zakiya Fuentes is a 44 y.o. female who presents today for evaluation of annual and severe rlq pain    Past Medical History:   Diagnosis Date    Abdominal pain     Abnormal Pap smear of cervix     Abnormal uterine bleeding     Acne     Acute sinus infection     Amenorrhea     Anxiety     Arthritis, rheumatoid (HCC)     Calculus of kidney     Dizziness     Endometriosis     Esophageal reflux     Fatigue     Fatigue     Fibrocystic breast changes     Herpes genitalis     Herpes simplex virus (HSV) infection     Hiatal hernia     Irregular menses     Menorrhagia     Pelvic pain     Pelvic pain     Pink eye     Premature labor     Supervision of high risk pregnancy in second trimester 08/23/2016    Swelling of lymph nodes     Two vessel umbilical cord in [place] pregnancy, antepartum 08/23/2016    Urinary leakage     Vaginal bleeding     Vaginal bleeding in pregnancy 08/23/2016    Vaginal discharge     Weight gain        Past Surgical History:   Procedure Laterality Date    CHOLECYSTECTOMY      COLPOSCOPY      ENDOMETRIAL ABLATION      HERNIA REPAIR      HYSTERECTOMY (CERVIX STATUS

## 2025-04-30 LAB
BACTERIA URNS QL MICRO: NORMAL /HPF
BILIRUB UR QL STRIP.AUTO: NEGATIVE
CLARITY UR: CLEAR
COLOR UR: YELLOW
EPI CELLS #/AREA URNS AUTO: 1 /HPF (ref 0–5)
GLUCOSE UR STRIP.AUTO-MCNC: NEGATIVE MG/DL
HGB UR QL STRIP.AUTO: NEGATIVE
HYALINE CASTS #/AREA URNS AUTO: 0 /LPF (ref 0–8)
KETONES UR STRIP.AUTO-MCNC: NEGATIVE MG/DL
LEUKOCYTE ESTERASE UR QL STRIP.AUTO: ABNORMAL
NITRITE UR QL STRIP.AUTO: NEGATIVE
PH UR STRIP.AUTO: 6 [PH] (ref 5–8)
PROT UR STRIP.AUTO-MCNC: NEGATIVE MG/DL
RBC CLUMPS #/AREA URNS AUTO: 0 /HPF (ref 0–4)
SP GR UR STRIP.AUTO: 1.01 (ref 1–1.03)
UA COMPLETE W REFLEX CULTURE PNL UR: ABNORMAL
UA DIPSTICK W REFLEX MICRO PNL UR: YES
URN SPEC COLLECT METH UR: ABNORMAL
UROBILINOGEN UR STRIP-ACNC: 0.2 E.U./DL
WBC #/AREA URNS AUTO: 0 /HPF (ref 0–5)

## 2025-05-01 NOTE — PROGRESS NOTES
Pt informed and given [facility]'s telephone number to call and sched. Pt voiced understanding at this time.

## 2025-05-02 ENCOUNTER — LAB (OUTPATIENT)
Dept: OBGYN | Age: 45
End: 2025-05-02
Payer: COMMERCIAL

## 2025-05-02 DIAGNOSIS — Z78.9 HEPATITIS B VACCINATION STATUS UNKNOWN: Primary | ICD-10-CM

## 2025-05-02 DIAGNOSIS — R10.31 CHRONIC RLQ PAIN: ICD-10-CM

## 2025-05-02 DIAGNOSIS — G89.29 CHRONIC RLQ PAIN: ICD-10-CM

## 2025-05-02 DIAGNOSIS — E66.3 OVERWEIGHT: ICD-10-CM

## 2025-05-02 PROCEDURE — 36415 COLL VENOUS BLD VENIPUNCTURE: CPT | Performed by: OBSTETRICS & GYNECOLOGY

## 2025-05-03 LAB
ALBUMIN SERPL-MCNC: 4.5 G/DL (ref 3.4–5)
ALBUMIN/GLOB SERPL: 2 {RATIO} (ref 1.1–2.2)
ALP SERPL-CCNC: 73 U/L (ref 40–129)
ALT SERPL-CCNC: 34 U/L (ref 10–40)
ANION GAP SERPL CALCULATED.3IONS-SCNC: 10 MMOL/L (ref 3–16)
AST SERPL-CCNC: 20 U/L (ref 15–37)
BILIRUB SERPL-MCNC: 0.4 MG/DL (ref 0–1)
BUN SERPL-MCNC: 12 MG/DL (ref 7–20)
CALCIUM SERPL-MCNC: 9.3 MG/DL (ref 8.3–10.6)
CHLORIDE SERPL-SCNC: 107 MMOL/L (ref 99–110)
CHOLEST SERPL-MCNC: 195 MG/DL (ref 0–199)
CO2 SERPL-SCNC: 25 MMOL/L (ref 21–32)
CREAT SERPL-MCNC: 0.7 MG/DL (ref 0.6–1.1)
DEPRECATED RDW RBC AUTO: 13.3 % (ref 12.4–15.4)
EST. AVERAGE GLUCOSE BLD GHB EST-MCNC: 91.1 MG/DL
GFR SERPLBLD CREATININE-BSD FMLA CKD-EPI: >90 ML/MIN/{1.73_M2}
GLUCOSE SERPL-MCNC: 91 MG/DL (ref 70–99)
HBA1C MFR BLD: 4.8 %
HBV SURFACE AB SERPL IA-ACNC: >1000 MIU/ML
HCT VFR BLD AUTO: 41.5 % (ref 36–48)
HDLC SERPL-MCNC: 59 MG/DL (ref 40–60)
HGB BLD-MCNC: 14.6 G/DL (ref 12–16)
LDLC SERPL CALC-MCNC: 118 MG/DL
MCH RBC QN AUTO: 29.9 PG (ref 26–34)
MCHC RBC AUTO-ENTMCNC: 35.2 G/DL (ref 31–36)
MCV RBC AUTO: 85 FL (ref 80–100)
PLATELET # BLD AUTO: 228 K/UL (ref 135–450)
PMV BLD AUTO: 10.3 FL (ref 5–10.5)
POTASSIUM SERPL-SCNC: 4.5 MMOL/L (ref 3.5–5.1)
PROT SERPL-MCNC: 6.8 G/DL (ref 6.4–8.2)
RBC # BLD AUTO: 4.89 M/UL (ref 4–5.2)
SODIUM SERPL-SCNC: 142 MMOL/L (ref 136–145)
TRIGL SERPL-MCNC: 91 MG/DL (ref 0–150)
TSH SERPL DL<=0.005 MIU/L-ACNC: 1.83 UIU/ML (ref 0.27–4.2)
VLDLC SERPL CALC-MCNC: 18 MG/DL
WBC # BLD AUTO: 6.4 K/UL (ref 4–11)

## 2025-05-04 DIAGNOSIS — R10.31 CHRONIC RLQ PAIN: ICD-10-CM

## 2025-05-04 DIAGNOSIS — G89.29 CHRONIC RLQ PAIN: ICD-10-CM

## 2025-05-04 LAB
HEMOCCULT SP1 STL QL: NORMAL
HEMOCCULT SP2 STL QL: NORMAL
HEMOCCULT SP3 STL QL: NORMAL

## 2025-05-06 LAB
SHBG SERPL-SCNC: 57 NMOL/L (ref 25–122)
TESTOST FREE SERPL-MCNC: <1 PG/ML (ref 1.1–5.8)
TESTOST SERPL-MCNC: 3 NG/DL (ref 8–48)

## 2025-05-08 ENCOUNTER — OFFICE VISIT (OUTPATIENT)
Dept: CARDIOLOGY CLINIC | Age: 45
End: 2025-05-08
Payer: COMMERCIAL

## 2025-05-08 VITALS
WEIGHT: 164.4 LBS | HEIGHT: 61 IN | BODY MASS INDEX: 31.04 KG/M2 | DIASTOLIC BLOOD PRESSURE: 82 MMHG | SYSTOLIC BLOOD PRESSURE: 120 MMHG | HEART RATE: 73 BPM

## 2025-05-08 DIAGNOSIS — R42 DIZZINESS: ICD-10-CM

## 2025-05-08 DIAGNOSIS — R07.2 PRECORDIAL PAIN: ICD-10-CM

## 2025-05-08 DIAGNOSIS — S22.018D OTHER CLOSED FRACTURE OF FIRST THORACIC VERTEBRA WITH ROUTINE HEALING, SUBSEQUENT ENCOUNTER: ICD-10-CM

## 2025-05-08 DIAGNOSIS — R00.2 PALPITATIONS: Primary | ICD-10-CM

## 2025-05-08 PROBLEM — S22.019D CLOSED FRACTURE OF FIRST THORACIC VERTEBRA WITH ROUTINE HEALING: Status: ACTIVE | Noted: 2025-05-08

## 2025-05-08 PROCEDURE — 1036F TOBACCO NON-USER: CPT | Performed by: INTERNAL MEDICINE

## 2025-05-08 PROCEDURE — G8427 DOCREV CUR MEDS BY ELIG CLIN: HCPCS | Performed by: INTERNAL MEDICINE

## 2025-05-08 PROCEDURE — G8417 CALC BMI ABV UP PARAM F/U: HCPCS | Performed by: INTERNAL MEDICINE

## 2025-05-08 PROCEDURE — 99204 OFFICE O/P NEW MOD 45 MIN: CPT | Performed by: INTERNAL MEDICINE

## 2025-05-08 PROCEDURE — 93000 ELECTROCARDIOGRAM COMPLETE: CPT | Performed by: INTERNAL MEDICINE

## 2025-05-08 NOTE — PROGRESS NOTES
120/82   Pulse 73   Ht 1.549 m (5' 1\")   Wt 74.6 kg (164 lb 6.4 oz)   LMP 11/25/2015 (Exact Date)   BMI 31.06 kg/m²   Wt Readings from Last 3 Encounters:   05/08/25 74.6 kg (164 lb 6.4 oz)   04/29/25 73.9 kg (163 lb)   06/24/24 67.1 kg (148 lb)     Body mass index is 31.06 kg/m².  Vitals:    05/08/25 1117   BP: 120/82   Pulse: 73        General Appearance:  No distress, conversant  Constitutional:  Well developed, Well nourished, No acute distress, Non-toxic appearance.   HENT:  Normocephalic, Atraumatic, Bilateral external ears normal, Oropharynx moist, No oral exudates, Nose normal. Neck- Normal range of motion, No tenderness, Supple, No stridor,no apical-carotid delay  Eyes:  PERRL, EOMI, Conjunctiva normal, No discharge.   Respiratory:  Normal breath sounds, No respiratory distress, No wheezing, No chest tenderness.,no use of accessory muscles, NO crackles  Cardiovascular: (PMI) apex non displaced,no lifts no thrills,S1 and S2 audible, No added heart sounds, No signs of ankle edema, or volume overload, No evidence of JVD, No crackles   GI:  Bowel sounds normal, Soft, No tenderness, No masses, No gross visceromegaly   :  No costovertebral angle tenderness   Musculoskeletal:  No edema, no tenderness, no deformities. Back- no tenderness  Integument:  Well hydrated, no rash   Lymphatic:  No lymphadenopathy noted   Neurologic:  Alert & oriented x 3, CN 2-12 normal, normal motor function, normal sensory function, no focal deficits noted   Psychiatric:  Speech and behavior appropriate       Medical decision making and Data review:  DATA:  No results found for: \"TROPONINT\"  BNP:  No results found for: \"PROBNP\"  PT/INR:  No results found for: \"PTINR\"  Lab Results   Component Value Date    LABA1C 4.8 05/02/2025     Lab Results   Component Value Date    CHOL 195 05/02/2025    TRIG 91 05/02/2025    HDL 59 05/02/2025     Lab Results   Component Value Date    ALT 34 05/02/2025    AST 20 05/02/2025     No results for

## 2025-05-08 NOTE — ASSESSMENT & PLAN NOTE
Will get 30-day monitor and lab work including TSH was all normal  Get treadmill to see if we can unmask any arrhythmias?  She was extensively counseled

## 2025-05-12 ASSESSMENT — PATIENT HEALTH QUESTIONNAIRE - PHQ9
SUM OF ALL RESPONSES TO PHQ QUESTIONS 1-9: 2
2. FEELING DOWN, DEPRESSED OR HOPELESS: SEVERAL DAYS
SUM OF ALL RESPONSES TO PHQ QUESTIONS 1-9: 2
SUM OF ALL RESPONSES TO PHQ9 QUESTIONS 1 & 2: 2
SUM OF ALL RESPONSES TO PHQ QUESTIONS 1-9: 2
1. LITTLE INTEREST OR PLEASURE IN DOING THINGS: SEVERAL DAYS
1. LITTLE INTEREST OR PLEASURE IN DOING THINGS: SEVERAL DAYS
SUM OF ALL RESPONSES TO PHQ QUESTIONS 1-9: 2
2. FEELING DOWN, DEPRESSED OR HOPELESS: SEVERAL DAYS

## 2025-05-15 ENCOUNTER — OFFICE VISIT (OUTPATIENT)
Dept: OBGYN | Age: 45
End: 2025-05-15
Payer: COMMERCIAL

## 2025-05-15 VITALS
BODY MASS INDEX: 30 KG/M2 | HEIGHT: 62 IN | DIASTOLIC BLOOD PRESSURE: 76 MMHG | SYSTOLIC BLOOD PRESSURE: 127 MMHG | WEIGHT: 163 LBS

## 2025-05-15 DIAGNOSIS — R10.31 CHRONIC RLQ PAIN: Primary | ICD-10-CM

## 2025-05-15 DIAGNOSIS — G89.29 CHRONIC RLQ PAIN: Primary | ICD-10-CM

## 2025-05-15 PROCEDURE — G8427 DOCREV CUR MEDS BY ELIG CLIN: HCPCS | Performed by: OBSTETRICS & GYNECOLOGY

## 2025-05-15 PROCEDURE — 99213 OFFICE O/P EST LOW 20 MIN: CPT | Performed by: OBSTETRICS & GYNECOLOGY

## 2025-05-15 PROCEDURE — G8417 CALC BMI ABV UP PARAM F/U: HCPCS | Performed by: OBSTETRICS & GYNECOLOGY

## 2025-05-15 PROCEDURE — 1036F TOBACCO NON-USER: CPT | Performed by: OBSTETRICS & GYNECOLOGY

## 2025-05-15 NOTE — PROGRESS NOTES
05/04/2025 201505/04/2025 2156  Blood Occult Stool Screen #3 [[id number]]    Stool   Component Value   Occult Blood, Stool #3 Result: Negative  Normal range: Negative   05/04/2025 201505/04/2025 2155  Blood Occult Stool Screen #2 [[id number]]    Stool   Component Value   Occult Blood, Stool #2 Result: Negative  Normal range: Negative   05/04/2025 201505/04/2025 2155  Blood Occult Stool Screen #1 [[id number]]    Stool   Component Value   Occult Blood Screening Result: Negative  Normal range: Negative   05/02/2025 081705/03/2025 0038  Hepatitis B Surface Antibody [[id number]]   Blood   Component Value Units   Hep B S Ab >1000.00  mIU/mL   05/02/2025 081705/03/2025 0027  Comprehensive Metabolic Panel [[id number]]   Blood   Component Value Units   Sodium 142 mmol/L   Potassium 4.5 mmol/L   Chloride 107 mmol/L   CO2 25 mmol/L   Anion Gap 10    Glucose 91 mg/dL   BUN 12 mg/dL   Creatinine 0.7 mg/dL   Est, Glom Filt Rate >90     Calcium 9.3 mg/dL   Total Protein 6.8 g/dL   Albumin 4.5 g/dL   Albumin/Globulin Ratio 2.0    Total Bilirubin 0.4 mg/dL   Alkaline Phosphatase 73 U/L   ALT 34 U/L   AST 20 U/L   05/02/2025 080905/03/2025 0118  CBC [[id number]]   Blood   Component Value Units   WBC 6.4 K/uL   RBC 4.89 M/uL   Hemoglobin 14.6 g/dL   Hematocrit 41.5 %   MCV 85.0 fL   MCH 29.9 pg   MCHC 35.2 g/dL   RDW 13.3 %   Platelets 228 K/uL   MPV 10.3 fL   05/02/2025 080905/03/2025 0037  Lipid Panel [[id number]]   (Abnormal)   Blood   Component Value Units   Cholesterol, Total 195 mg/dL   Triglycerides 91 mg/dL   HDL 59  mg/dL   LDL Cholesterol 118 High  mg/dL   VLDL Cholesterol Calculated 18 mg/dL   05/02/2025 8332310905/03/2025 0802  Hemoglobin A1C [[id number]]   Blood   Component Value Units   Hemoglobin A1C 4.8  %   Estimated Avg Glucose 91.1 mg/dL   05/02/2025 080905/03/2025 0037  TSH reflex to FT4, FT3 [[id number]]   Blood   Component Value Units   TSH 1.83 uIU/mL   05/02/2025 7793780905/06/2025 1930  Testosterone, free, total

## 2025-05-28 ENCOUNTER — HOSPITAL ENCOUNTER (OUTPATIENT)
Dept: MAMMOGRAPHY | Age: 45
Discharge: HOME | End: 2025-05-28
Attending: OBSTETRICS & GYNECOLOGY
Payer: COMMERCIAL

## 2025-05-28 ENCOUNTER — RESULTS FOLLOW-UP (OUTPATIENT)
Dept: OBGYN | Age: 45
End: 2025-05-28

## 2025-05-28 DIAGNOSIS — Z13.820 ENCOUNTER FOR OSTEOPOROSIS SCREENING IN ASYMPTOMATIC POSTMENOPAUSAL PATIENT: ICD-10-CM

## 2025-05-28 DIAGNOSIS — Z78.0 ENCOUNTER FOR OSTEOPOROSIS SCREENING IN ASYMPTOMATIC POSTMENOPAUSAL PATIENT: ICD-10-CM

## 2025-05-28 DIAGNOSIS — Z12.31 SCREENING MAMMOGRAM FOR BREAST CANCER: ICD-10-CM

## 2025-05-28 PROCEDURE — 77080 DXA BONE DENSITY AXIAL: CPT

## 2025-05-28 PROCEDURE — 77063 BREAST TOMOSYNTHESIS BI: CPT

## 2025-05-29 DIAGNOSIS — R92.30 DENSE BREAST TISSUE ON MAMMOGRAM, UNSPECIFIED TYPE: Primary | ICD-10-CM

## 2025-05-30 ENCOUNTER — RESULTS FOLLOW-UP (OUTPATIENT)
Dept: OBGYN | Age: 45
End: 2025-05-30

## 2025-06-04 ENCOUNTER — HOSPITAL ENCOUNTER (OUTPATIENT)
Dept: WOMENS IMAGING | Age: 45
Discharge: HOME | End: 2025-06-04
Payer: COMMERCIAL

## 2025-06-04 DIAGNOSIS — R92.8 ABNORMAL MAMMOGRAM OF LEFT BREAST: ICD-10-CM

## 2025-06-04 PROCEDURE — G0279 TOMOSYNTHESIS, MAMMO: HCPCS

## 2025-06-05 ENCOUNTER — RESULTS FOLLOW-UP (OUTPATIENT)
Dept: CARDIOLOGY CLINIC | Age: 45
End: 2025-06-05

## 2025-06-05 ENCOUNTER — RESULTS FOLLOW-UP (OUTPATIENT)
Dept: OBGYN | Age: 45
End: 2025-06-05

## 2025-06-05 ENCOUNTER — HOSPITAL ENCOUNTER (OUTPATIENT)
Dept: ULTRASOUND IMAGING | Age: 45
Discharge: HOME | End: 2025-06-05
Payer: COMMERCIAL

## 2025-06-05 DIAGNOSIS — R92.30 DENSE BREAST TISSUE ON MAMMOGRAM, UNSPECIFIED TYPE: ICD-10-CM

## 2025-06-05 PROCEDURE — 76642 ULTRASOUND BREAST LIMITED: CPT

## 2025-06-05 PROCEDURE — 76641 ULTRASOUND BREAST COMPLETE: CPT

## 2025-06-06 NOTE — TELEPHONE ENCOUNTER
----- Message from DAVID BERNAL MA sent at 6/6/2025  8:39 AM EDT -----    ----- Message -----  From: Osvaldo Soto MD  Sent: 6/5/2025   5:55 PM EDT  To: Bridget Dooley MA    Follow-up in office as routine within few weeks

## 2025-06-06 NOTE — TELEPHONE ENCOUNTER
Patient has appt 6/8 to go over results    Monitor worn from eighth May to 1 June 2025 there were 19 patient triggers overall the average heart rate was 90 bpm no episodes of atrial fibrillation recorded.  Patient is primarily in sinus rhythm reported symptoms of flutter or skipped beat on several occasions worse associated with sinus tachycardia or sinus rhythm few episodes of PACs or 3-4 beats of SVT noted which was associated with patient reported symptoms of flutter or skipped beat with a heart rate of 142.  On several occasions patient reported symptoms were associated with sinus tachycardia or PACs as well as SVT with a heart rate of 164 and a run of SVT at 7:34 PM was noted.  Patient reported associated symptoms with SVT.  Clinical correlation needed  Monitor showing episodes of SVT PACs.